# Patient Record
Sex: FEMALE | Race: WHITE | URBAN - NONMETROPOLITAN AREA
[De-identification: names, ages, dates, MRNs, and addresses within clinical notes are randomized per-mention and may not be internally consistent; named-entity substitution may affect disease eponyms.]

---

## 2024-01-12 ENCOUNTER — OFFICE VISIT (OUTPATIENT)
Dept: URBAN - NONMETROPOLITAN AREA CLINIC 13 | Facility: CLINIC | Age: 74
End: 2024-01-12

## 2024-02-09 ENCOUNTER — OV NP (OUTPATIENT)
Dept: URBAN - NONMETROPOLITAN AREA CLINIC 13 | Facility: CLINIC | Age: 74
End: 2024-02-09

## 2024-02-13 ENCOUNTER — OV NP (OUTPATIENT)
Dept: URBAN - NONMETROPOLITAN AREA CLINIC 2 | Facility: CLINIC | Age: 74
End: 2024-02-13

## 2024-03-01 ENCOUNTER — OV NP (OUTPATIENT)
Dept: URBAN - NONMETROPOLITAN AREA CLINIC 13 | Facility: CLINIC | Age: 74
End: 2024-03-01

## 2024-03-29 ENCOUNTER — OV NP (OUTPATIENT)
Dept: URBAN - NONMETROPOLITAN AREA CLINIC 13 | Facility: CLINIC | Age: 74
End: 2024-03-29
Payer: MEDICARE

## 2024-03-29 ENCOUNTER — LAB (OUTPATIENT)
Dept: URBAN - NONMETROPOLITAN AREA CLINIC 13 | Facility: CLINIC | Age: 74
End: 2024-03-29

## 2024-03-29 VITALS
DIASTOLIC BLOOD PRESSURE: 75 MMHG | HEIGHT: 60 IN | WEIGHT: 157 LBS | HEART RATE: 71 BPM | SYSTOLIC BLOOD PRESSURE: 166 MMHG | BODY MASS INDEX: 30.82 KG/M2

## 2024-03-29 DIAGNOSIS — Z12.11 SCREENING FOR COLON CANCER: ICD-10-CM

## 2024-03-29 DIAGNOSIS — R19.5 POSITIVE FIT (FECAL IMMUNOCHEMICAL TEST): ICD-10-CM

## 2024-03-29 PROBLEM — 59614000: Status: ACTIVE | Noted: 2024-03-29

## 2024-03-29 PROBLEM — 305058001: Status: ACTIVE | Noted: 2024-03-29

## 2024-03-29 PROCEDURE — 99203 OFFICE O/P NEW LOW 30 MIN: CPT

## 2024-03-29 RX ORDER — VENLAFAXINE HYDROCHLORIDE 150 MG/1
CAPSULE, EXTENDED RELEASE ORAL
Qty: 90 CAPSULE | Status: ACTIVE | COMMUNITY

## 2024-03-29 RX ORDER — POTASSIUM CHLORIDE 750 MG/1
TAKE 2 TABLETS BY MOUTH TWICE DAILY TABLET, EXTENDED RELEASE ORAL
Qty: 360 EACH | Refills: 1 | Status: ACTIVE | COMMUNITY

## 2024-03-29 RX ORDER — LISINOPRIL 20 MG/1
TABLET ORAL
Qty: 90 UNSPECIFIED | Status: ACTIVE | COMMUNITY

## 2024-03-29 RX ORDER — ATORVASTATIN CALCIUM 40 MG/1
TABLET, FILM COATED ORAL
Qty: 90 TABLET | Status: ACTIVE | COMMUNITY

## 2024-03-29 RX ORDER — SODIUM PICOSULFATE, MAGNESIUM OXIDE, AND ANHYDROUS CITRIC ACID 12; 3.5; 1 G/175ML; G/175ML; MG/175ML
175 ML THE FIRST DOSE THE EVENING BEFORE AND SECOND DOSE THE MORNING OF COLONOSCOPY LIQUID ORAL ONCE A DAY
Qty: 700 | OUTPATIENT
Start: 2024-03-29 | End: 2024-03-31

## 2024-03-29 NOTE — HPI-TODAY'S VISIT:
3/29/2024  Mrs. Doty presents to schedule repeat colonoscopy now with  positive FIT with PCP. This was done routinely. She states she has  normal BMs daily. Previous colonoscopy was  over 10 yrs ago . Overall the patient has been feeling well.  The patient denies anorexia or weight loss.  In general the patient has regular BM's.  In general the patient denies constipation, diarrhea, hematochezia, BRBPR, or melena.  The patient denies abdominal pain.  The patient denies GERD/N/V/dysphagia.  There is no FHx of colon cancer.  SP

## 2024-06-10 ENCOUNTER — TELEPHONE ENCOUNTER (OUTPATIENT)
Dept: URBAN - NONMETROPOLITAN AREA CLINIC 2 | Facility: CLINIC | Age: 74
End: 2024-06-10

## 2024-06-17 ENCOUNTER — OFFICE VISIT (OUTPATIENT)
Dept: URBAN - NONMETROPOLITAN AREA SURGERY CENTER 1 | Facility: SURGERY CENTER | Age: 74
End: 2024-06-17
Payer: MEDICARE

## 2024-06-17 DIAGNOSIS — Z12.11 ENCOUNTER SCREENING FOR MALIGNANT NEOPLASM OF COLON: ICD-10-CM

## 2024-06-17 DIAGNOSIS — Z12.11 COLON CANCER SCREENING: ICD-10-CM

## 2024-06-17 DIAGNOSIS — K57.30 DIVERTICULOSIS OF COLON: ICD-10-CM

## 2024-06-17 PROCEDURE — G0121 COLON CA SCRN NOT HI RSK IND: HCPCS | Performed by: CLINIC/CENTER

## 2024-06-17 PROCEDURE — 00812 ANES LWR INTST SCR COLSC: CPT | Performed by: NURSE ANESTHETIST, CERTIFIED REGISTERED

## 2024-06-17 PROCEDURE — G0121 COLON CA SCRN NOT HI RSK IND: HCPCS | Performed by: INTERNAL MEDICINE

## 2024-06-17 RX ORDER — LISINOPRIL 20 MG/1
TABLET ORAL
Qty: 90 UNSPECIFIED | Status: ACTIVE | COMMUNITY

## 2024-06-17 RX ORDER — POTASSIUM CHLORIDE 750 MG/1
TAKE 2 TABLETS BY MOUTH TWICE DAILY TABLET, EXTENDED RELEASE ORAL
Qty: 360 EACH | Refills: 1 | Status: ACTIVE | COMMUNITY

## 2024-06-17 RX ORDER — ATORVASTATIN CALCIUM 40 MG/1
TABLET, FILM COATED ORAL
Qty: 90 TABLET | Status: ACTIVE | COMMUNITY

## 2024-06-17 RX ORDER — VENLAFAXINE HYDROCHLORIDE 150 MG/1
CAPSULE, EXTENDED RELEASE ORAL
Qty: 90 CAPSULE | Status: ACTIVE | COMMUNITY